# Patient Record
Sex: FEMALE | Race: AMERICAN INDIAN OR ALASKA NATIVE
[De-identification: names, ages, dates, MRNs, and addresses within clinical notes are randomized per-mention and may not be internally consistent; named-entity substitution may affect disease eponyms.]

---

## 2018-03-22 ENCOUNTER — HOSPITAL ENCOUNTER (OUTPATIENT)
Dept: HOSPITAL 14 - H.ER | Age: 48
Setting detail: OBSERVATION
LOS: 2 days | Discharge: HOME | End: 2018-03-24
Attending: SPECIALIST | Admitting: SPECIALIST
Payer: COMMERCIAL

## 2018-03-22 VITALS — BODY MASS INDEX: 26.7 KG/M2

## 2018-03-22 DIAGNOSIS — D25.9: ICD-10-CM

## 2018-03-22 DIAGNOSIS — D50.9: Primary | ICD-10-CM

## 2018-03-22 LAB
% IRON SATURATION: 2.3 % (ref 20–55)
ALBUMIN SERPL-MCNC: 3.9 G/DL (ref 3.5–5)
ALBUMIN/GLOB SERPL: 1.1 {RATIO} (ref 1–2.1)
ALT SERPL-CCNC: 28 U/L (ref 9–52)
AST SERPL-CCNC: 23 U/L (ref 14–36)
BASOPHILS # BLD AUTO: 0.2 K/UL (ref 0–0.2)
BASOPHILS NFR BLD: 2.5 % (ref 0–2)
BILIRUB UR-MCNC: NEGATIVE MG/DL
BUN SERPL-MCNC: 11 MG/DL (ref 7–17)
CALCIUM SERPL-MCNC: 9.2 MG/DL (ref 8.4–10.2)
COLOR UR: (no result)
EOSINOPHIL # BLD AUTO: 0.2 K/UL (ref 0–0.7)
EOSINOPHIL NFR BLD: 2.6 % (ref 0–4)
ERYTHROCYTE [DISTWIDTH] IN BLOOD BY AUTOMATED COUNT: 31.8 % (ref 11.5–14.5)
FOLATE SERPL-MCNC: > 20 NG/ML
GFR NON-AFRICAN AMERICAN: > 60
GLUCOSE UR STRIP-MCNC: (no result) MG/DL
HGB BLD-MCNC: 5.3 G/DL (ref 12–16)
INR PPP: 1.4 (ref 0.9–1.2)
IRON SERPL-MCNC: < 10 UG/DL (ref 37–170)
LEUKOCYTE ESTERASE UR-ACNC: (no result) LEU/UL
LYMPHOCYTES # BLD AUTO: 1.4 K/UL (ref 1–4.3)
LYMPHOCYTES NFR BLD AUTO: 19 % (ref 20–40)
MCH RBC QN AUTO: 18 PG (ref 27–31)
MCHC RBC AUTO-ENTMCNC: 28.9 G/DL (ref 33–37)
MCV RBC AUTO: 62.3 FL (ref 81–99)
MONOCYTES # BLD: 0.6 K/UL (ref 0–0.8)
MONOCYTES NFR BLD: 7.6 % (ref 0–10)
NEUTROPHILS # BLD: 5 K/UL (ref 1.8–7)
NEUTROPHILS NFR BLD AUTO: 68.3 % (ref 50–75)
NRBC BLD AUTO-RTO: 0.4 % (ref 0–0)
PH UR STRIP: 7 [PH] (ref 5–8)
PLATELET # BLD: 759 K/UL (ref 130–400)
PMV BLD AUTO: 8.7 FL (ref 7.2–11.7)
PROT UR STRIP-MCNC: NEGATIVE MG/DL
PROTHROMBIN TIME: 16 SECONDS (ref 9.8–13.1)
RBC # BLD AUTO: 2.94 MIL/UL (ref 3.8–5.2)
RBC # UR STRIP: NEGATIVE /UL
SP GR UR STRIP: 1 (ref 1–1.03)
SQUAMOUS EPITHIAL: < 1 /HPF (ref 0–5)
TIBC SERPL-MCNC: 428 UG/DL (ref 250–450)
URINE CLARITY: CLEAR
UROBILINOGEN UR-MCNC: (no result) MG/DL (ref 0.2–1)
VIT B12 SERPL-MCNC: > 1000 PG/ML (ref 239–931)
WBC # BLD AUTO: 7.3 K/UL (ref 4.8–10.8)

## 2018-03-22 PROCEDURE — 80053 COMPREHEN METABOLIC PANEL: CPT

## 2018-03-22 PROCEDURE — 36415 COLL VENOUS BLD VENIPUNCTURE: CPT

## 2018-03-22 PROCEDURE — 85014 HEMATOCRIT: CPT

## 2018-03-22 PROCEDURE — 86900 BLOOD TYPING SEROLOGIC ABO: CPT

## 2018-03-22 PROCEDURE — 83540 ASSAY OF IRON: CPT

## 2018-03-22 PROCEDURE — 99285 EMERGENCY DEPT VISIT HI MDM: CPT

## 2018-03-22 PROCEDURE — 83550 IRON BINDING TEST: CPT

## 2018-03-22 PROCEDURE — 82607 VITAMIN B-12: CPT

## 2018-03-22 PROCEDURE — 85025 COMPLETE CBC W/AUTO DIFF WBC: CPT

## 2018-03-22 PROCEDURE — 76856 US EXAM PELVIC COMPLETE: CPT

## 2018-03-22 PROCEDURE — 85610 PROTHROMBIN TIME: CPT

## 2018-03-22 PROCEDURE — 85018 HEMOGLOBIN: CPT

## 2018-03-22 PROCEDURE — 85660 RBC SICKLE CELL TEST: CPT

## 2018-03-22 PROCEDURE — 83021 HEMOGLOBIN CHROMOTOGRAPHY: CPT

## 2018-03-22 PROCEDURE — 86850 RBC ANTIBODY SCREEN: CPT

## 2018-03-22 PROCEDURE — 74177 CT ABD & PELVIS W/CONTRAST: CPT

## 2018-03-22 PROCEDURE — 85041 AUTOMATED RBC COUNT: CPT

## 2018-03-22 PROCEDURE — 86920 COMPATIBILITY TEST SPIN: CPT

## 2018-03-22 PROCEDURE — 81003 URINALYSIS AUTO W/O SCOPE: CPT

## 2018-03-22 PROCEDURE — 82746 ASSAY OF FOLIC ACID SERUM: CPT

## 2018-03-22 PROCEDURE — 71046 X-RAY EXAM CHEST 2 VIEWS: CPT

## 2018-03-22 PROCEDURE — 36430 TRANSFUSION BLD/BLD COMPNT: CPT

## 2018-03-22 PROCEDURE — 82728 ASSAY OF FERRITIN: CPT

## 2018-03-22 PROCEDURE — 74183 MRI ABD W/O CNTR FLWD CNTR: CPT

## 2018-03-22 PROCEDURE — 85044 MANUAL RETICULOCYTE COUNT: CPT

## 2018-03-22 PROCEDURE — 85027 COMPLETE CBC AUTOMATED: CPT

## 2018-03-22 PROCEDURE — 93005 ELECTROCARDIOGRAM TRACING: CPT

## 2018-03-22 PROCEDURE — 81025 URINE PREGNANCY TEST: CPT

## 2018-03-22 NOTE — RAD
HISTORY:

Anemia  



COMPARISON:

No prior.



TECHNIQUE:

Chest PA and lateral



FINDINGS:



LUNGS:

No active pulmonary disease.



PLEURA:

No significant pleural effusion identified. No pneumothorax apparent.



CARDIOVASCULAR:

Normal.



OSSEOUS STRUCTURES:

No significant abnormalities.



VISUALIZED UPPER ABDOMEN:

Normal.



OTHER FINDINGS:

None.



IMPRESSION:

No active disease.

## 2018-03-22 NOTE — ED PDOC
HPI: General Adult


Time Seen by Provider: 03/22/18 09:36


Chief Complaint (Nursing): Abnormal Labs


Chief Complaint (Provider): Abnormal labs


History Per: Patient


History/Exam Limitations: no limitations


Onset/Duration Of Symptoms: Hrs (today)


Current Symptoms Are (Timing): Still Present


Recently: Treated By A Physician


Additional Complaint(s): 





Geovany Moreno is a 47 year old female, with no significant past medical 

history, who was sent to the emergency department by PMD for outpatient 

hemoglobin of 5.4. Patient states she has been mildly fatigued recently. She 

denies any chest pain, shortness of breath, bloody or melanotic stools, and 

heavy period. No further medical complaints.





PMD: Kevin Contreras





Past Medical History


Reviewed: Historical Data, Nursing Documentation, Vital Signs


Vital Signs: 


 Last Vital Signs











Temp  98.2 F   03/22/18 09:38


 


Pulse  76   03/22/18 09:38


 


Resp  20   03/22/18 09:38


 


BP  152/80 H  03/22/18 09:38


 


Pulse Ox  99   03/22/18 10:33














- Medical History


PMH: No Chronic Diseases





- Surgical History


Surgical History: No Surg Hx





- Family History


Family History: States: Other


Other Family History: anemia of unknown type





- Social History


Current smoker - smoking cessation education provided: No


Alcohol: None


Drugs: Denies





- Home Medications


Home Medications: 


 Ambulatory Orders











 Medication  Instructions  Recorded


 


No Known Home Med  03/22/18














- Allergies


Allergies/Adverse Reactions: 


 Allergies











Allergy/AdvReac Type Severity Reaction Status Date / Time


 


No Known Allergies Allergy   Verified 03/22/18 09:52














Review of Systems


ROS Statement: Except As Marked, All Systems Reviewed And Found Negative


Cardiovascular: Negative for: Chest Pain


Respiratory: Negative for: Shortness of Breath


Gastrointestinal: Negative for: Melena, Hematochezia





Physical Exam





- Reviewed


Nursing Documentation Reviewed: Yes


Vital Signs Reviewed: Yes





- Physical Exam


Appears: Positive for: Well, Non-toxic, No Acute Distress


Head Exam: Positive for: ATRAUMATIC, NORMAL INSPECTION, NORMOCEPHALIC


Skin: Positive for: Normal Color, Warm, Dry


Eye Exam: Positive for: EOMI, PERRL.  Negative for: Normal appearance (

conjunctival pallor)


Neck: Positive for: Normal (No thyromegaly), Painless ROM, Supple


Cardiovascular/Chest: Positive for: Regular Rate, Rhythm.  Negative for: Murmur


Respiratory: Positive for: Normal Breath Sounds.  Negative for: Respiratory 

Distress


Gastrointestinal/Abdominal: Positive for: Normal Exam, Soft.  Negative for: 

Tenderness


Extremity: Positive for: Normal ROM (all extremities), Other (pale nail beds).  

Negative for: Deformity, Swelling


Neurologic/Psych: Positive for: Alert, Oriented (x3).  Negative for: Motor/

Sensory Deficits





- Laboratory Results


Result Diagrams: 


 03/22/18 10:10





 03/22/18 10:10





- ECG


O2 Sat by Pulse Oximetry: 99 (RA)


Pulse Ox Interpretation: Normal





Medical Decision Making


Medical Decision Making: 





Initial Plan:





--Type and screen


--EKG


--CMP


--Folate


--Iron & TIBC


--Vitamin b12


--Urine pregnancy


--CBC w/ differential


--Reticulocyte count


--Sickle cell screen


--Chest two views (PA/LAT) [RAD]


--Sodium Chloride 1,000 ml  mls/hr


--Urinalysis


--Reevaluation





-ABO/RH type








--------------------------------------------------------------------------------

-----------------   


Scribe Attestation:


Documented by Guru Leahy, acting as a scribe for Kevin Hammond MD





Provider Scribe Attestation:


All medical record entries made by the Scribe were at my direction and 

personally dictated by me. I have reviewed the chart and agree that the record 

accurately reflects my personal performance of the history, physical exam, 

medical decision making, and the department course for this patient. I have 

also personally directed, reviewed, and agree with the discharge instructions 

and disposition.





Disposition





- Clinical Impression


Clinical Impression: 


 Anemia








- Patient ED Disposition


Is Patient to be Admitted: Yes





- Disposition


Disposition Time: 11:19


Condition: FAIR


Forms:  [facility] (English)





- Pt Status Changed To:


Hospital Disposition Of: Inpatient





- Admit Certification


Admit to Inpatient:: After my assessment, the patient will require 

hospitalization for at least two midnights.  This is because of the severity of 

symptoms shown, intensity of services needed, and/or the medical risk in this 

patient being treated as an outpatient.





- POA


Present On Arrival: None

## 2018-03-22 NOTE — CT
PROCEDURE:  CT Abdomen and Pelvis with contrast



HISTORY:

Anemia



COMPARISON:

None.



TECHNIQUE:

Contrast dose: Omnipaque 300, 95 cc.



Radiation dose:



Total exam DLP = 593.92 mGy-cm.



This CT exam was performed using one or more of the following dose 

reduction techniques: Automated exposure control, adjustment of the 

mA and/or kV according to patient size, and/or use of iterative 

reconstruction technique.



FINDINGS:



LOWER THORAX:

Unremarkable. 



LIVER:

There are a few tiny scattered lucencies in the left and right lobes 

liver which are under 1 cm size a too small to characterize. 1 cm 

cyst is seen at the left lobe anteriorly (8 Hounsfield units) with an 

indeterminate lucency measuring 1.7 cm at the inferomedial right lobe 

liver measuring 18 Hounsfield units. Further, mild diffusely 

diminished attenuation seen throughout the liver suggests of hepatic 

steatosis. No intrahepatic biliary dilatation identified. 



GALLBLADDER AND BILE DUCTS:

Contracted and otherwise unremarkable appearing. 



PANCREAS:

Unremarkable. No gross lesion or ductal dilatation.



SPLEEN:

Unremarkable. 



ADRENALS:

Unremarkable. No mass. 



KIDNEYS AND URETERS:

Bilateral renal lucencies are scattered in a relatively numerous. The 

majority are under 1 cm size are too small to characterize.  However, 

there are several which exhibit intermediate density measurements and 

therefore cannot be characterized as simple cyst. At the upper pole 

left kidney, there is a 1.8 cm lucency measuring 25 Hounsfield units. 

At the upper pole right kidney is a 1.9 cm lucency measuring 21 

Hounsfield units. At the midpole right kidney anteriorly is a 2.3 cm 

lucency measuring 19 Hounsfield units and posterior medial to it is a 

1.9 cm lucency measuring 19 Hounsfield units as well. Finally a 2.2 

cm lucency is seen at the lower pole right kidney measuring 18 

Hounsfield units. This lucencies likely reflect benign renal cortical 

cysts though potentially complicated. A precautionary elective 

follow-up MRI or CT of the kidneys is recommended without and with 

contrast, or elective ultrasound of the kidneys.



No obstructive uropathy or perinephric reaction bilaterally. No 

radiodense urolithiasis. 



VASCULATURE:

Unremarkable. No aortic aneurysm. 



BOWEL:

Moderate fecal loading is seen throughout the right hemicolon and is 

limited throughout the remainder.  A umbilical hernia is identified 

containing a limited amount of small bowel without obvious 

incarceration pattern evident. No bowel obstruction is appreciated 

overall. The stomach is collapsed and poorly evaluated.



APPENDIX:

Normal appendix. 



PERITONEUM:

Unremarkable. No free fluid. No free air. 



LYMPH NODES:

Unremarkable. No enlarged lymph nodes. 



BLADDER:

Decompressed with mural thickening evident.  Consider potential 

cystitis though this is not definite. 



REPRODUCTIVE:

Enlarged uterus identified with numerous small masses related to the 

myometrium and 1 apparently exophytic off the lower uterine segment 

laterally suggestive of extensive fibroid disease. 



BONES:

No acute fracture. 



OTHER FINDINGS:

None.



IMPRESSION:

1. No definite acute abdominal finding although a small umbilical 

hernia contains limited small-bowel loops. No definite incarceration 

pattern appreciable this time. 



2. A small right lobe hepatic cysts probably reflects a benign cyst 

but has slightly elevated density.  Consider follow-up elective MRI 

or CT with and without contrast for additional evaluation or 

ultrasonography. Similar changes are seen but on a more numerous 

pattern in the right kidney as well as a solitary lucency at the 

upper pole left kidney with the same follow-up elective imaging 

recommendation. 



3. Enlarged fibroid uterus noted. 



4. Thickened urinary bladder walls may be a function of decompression 

the cystitis is not excluded.  Further clinical correlation is 

recommended.

## 2018-03-23 LAB
ERYTHROCYTE [DISTWIDTH] IN BLOOD BY AUTOMATED COUNT: 34.1 % (ref 11.5–14.5)
HGB BLD-MCNC: 8.1 G/DL (ref 12–16)
MCH RBC QN AUTO: 18.1 PG (ref 27–33)
MCH RBC QN AUTO: 21 PG (ref 27–31)
MCHC RBC AUTO-ENTMCNC: 30.7 G/DL (ref 33–37)
MCV RBC AUTO: 68.4 FL (ref 81–99)
MCV RBC: 64 FL (ref 80–100)
PLATELET # BLD: 789 K/UL (ref 130–400)
RBC # BLD AUTO: 3.87 MIL/UL (ref 3.8–5.2)
WBC # BLD AUTO: 9.4 K/UL (ref 4.8–10.8)

## 2018-03-23 NOTE — CP.PCM.CON
History of Present Illness





- History of Present Illness


History of Present Illness: 





This is a 47 yrs old female who was admitted for anemia. She was brought to her 

primary doctor's office and was found to have a low hgb and was sent to the ER 

with c/o some weakness. Her Hgb was found to be 5.3gms wbc 7.3 and platelets 

759K.  She does not give a history of bleeding from  any site, her [id number] 

according to her were normal, but here in the hospital she is having  a period 

and the nurse told the pt that it was heavy. Pt's mother  recently of anemia

, and her sister has anemia too. She has no abdominal pain, and no hgb 

electrophoresis has ever been done. 


retic count was 3.4,  iron<30, sat 2.3%, and ferritin is only 3.0. Her B12 is 

normal but the sickle prep is normal. A hgb electrophoresis hqas been ordered.


the ct scan of the abdomen showed a couple of ?cysts in the liver and in the 

kidneys. She also has a fibroid of the uterus. The radiologist has suggested a 

MRI


of the abdomen to clarify the lesions in the liver and kidneys.





No significant medical problems





Pt is a non smoker and does not drink,





Family history; Mother  of anemia and younger sister is also anemic





Past Patient History





- Past Medical History & Family History


Past Medical History?: No





- Past Social History


Smoking Status: Never Smoked





- CARDIAC


Hx Cardiac Disorders: No





- PULMONARY


Hx Respiratory Disorders: No





- NEUROLOGICAL


Hx Neurological Disorder: No





- HEENT


Hx HEENT Problems: No





- RENAL


Hx Chronic Kidney Disease: No





- ENDOCRINE/METABOLIC


Hx Endocrine Disorders: No





- HEMATOLOGICAL/ONCOLOGICAL


Hx Blood Disorders: Yes (anemia)





- INTEGUMENTARY


Hx Dermatological Problems: No





- MUSCULOSKELETAL/RHEUMATOLOGICAL


Hx Musculoskeletal Disorders: No


Hx Falls: No





- GENITOURINARY/GYNECOLOGICAL


Hx Genitourinary Disorders: No





- PSYCHIATRIC


Hx Psychophysiologic Disorder: No


Hx Substance Use: No





- SURGICAL HISTORY


Hx Surgeries: No





- ANESTHESIA


Hx Anesthesia: No





Meds


Allergies/Adverse Reactions: 


 Allergies











Allergy/AdvReac Type Severity Reaction Status Date / Time


 


No Known Allergies Allergy   Verified 18 09:52














- Medications


Medications: 


 Current Medications





Docusate Sodium (Colace)  200 mg PO DAILY PRN


   PRN Reason: Constipation


Ferrous Sulfate (Feosol)  325 mg PO BID LARA











Physical Exam





- Additional Findings


Additional findings: 


Physical exam; Alert, well oriented in no acute distress


neck supple, no adenopathy


Chest; Clear, no rales or rhonchi


Heart; RSR, no murmur


Abd; soft, no mass, no h/s megaly








Results





- Vital Signs


Recent Vital Signs: 


 Last Vital Signs











Temp  98.1 F   18 08:03


 


Pulse  55 L  18 08:03


 


Resp  20   18 08:03


 


BP  129/65   18 08:03


 


Pulse Ox  100   18 08:03














- Labs


Result Diagrams: 


 18 10:10





 18 10:10


Labs: 


 Laboratory Results - last 24 hr











  18





  10:00 10:10 10:10


 


WBC   7.3 


 


RBC   2.94 L 


 


Hgb   5.3 L* 


 


Hct   18.3 L 


 


MCV   62.3 L 


 


MCH   18.0 L 


 


MCHC   28.9 L 


 


RDW   31.8 H 


 


Plt Count   759 H 


 


MPV   8.7 


 


Neut % (Auto)   68.3 


 


Lymph % (Auto)   19.0 L 


 


Mono % (Auto)   7.6 


 


Eos % (Auto)   2.6 


 


Baso % (Auto)   2.5 H 


 


Neut # (Auto)   5.0 


 


Lymph # (Auto)   1.4 


 


Mono # (Auto)   0.6 


 


Eos # (Auto)   0.2 


 


Baso # (Auto)   0.2 


 


Retic Count   


 


Sickle Cell Screen   


 


Hemoglobinopathy Red Blood Count   


 


Hemoglobinopathy Hct   


 


Hemoglobinopathy Hgb   


 


Hemoglobinopathy MCV   


 


Hemoglobinopathy MCH   


 


Hemoglobinopathy RDW   


 


PT   


 


INR   


 


Sodium    144


 


Potassium    4.0


 


Chloride    105


 


Carbon Dioxide    26


 


Anion Gap    17


 


BUN    11


 


Creatinine    0.8


 


Est GFR ( Amer)    > 60


 


Est GFR (Non-Af Amer)    > 60


 


Random Glucose    98


 


Calcium    9.2


 


Iron  < 10 L  


 


TIBC  428  


 


% Saturation  2.3 L  


 


Ferritin   


 


Total Bilirubin    0.5


 


AST    23


 


ALT    28


 


Alkaline Phosphatase    45


 


Total Protein    7.6


 


Albumin    3.9


 


Globulin    3.6


 


Albumin/Globulin Ratio    1.1


 


Vitamin B12    > 1000 H


 


Folate    > 20.0


 


Urine Color   


 


Urine Clarity   


 


Urine pH   


 


Ur Specific Gravity   


 


Urine Protein   


 


Urine Glucose (UA)   


 


Urine Ketones   


 


Urine Blood   


 


Urine Nitrate   


 


Urine Bilirubin   


 


Urine Urobilinogen   


 


Ur Leukocyte Esterase   


 


Urine RBC (Auto)   


 


Urine Microscopic WBC   


 


Ur Squamous Epith Cells   


 


Blood Type   


 


Blood Type Confirm   


 


Antibody Screen   


 


Crossmatch   


 


BBK History Checked   














  18





  10:10 10:10 10:10


 


WBC   


 


RBC   


 


Hgb   


 


Hct   


 


MCV   


 


MCH   


 


MCHC   


 


RDW   


 


Plt Count   


 


MPV   


 


Neut % (Auto)   


 


Lymph % (Auto)   


 


Mono % (Auto)   


 


Eos % (Auto)   


 


Baso % (Auto)   


 


Neut # (Auto)   


 


Lymph # (Auto)   


 


Mono # (Auto)   


 


Eos # (Auto)   


 


Baso # (Auto)   


 


Retic Count    3.4 H


 


Sickle Cell Screen   


 


Hemoglobinopathy Red Blood Count   


 


Hemoglobinopathy Hct   


 


Hemoglobinopathy Hgb   


 


Hemoglobinopathy MCV   


 


Hemoglobinopathy MCH   


 


Hemoglobinopathy RDW   


 


PT   


 


INR   


 


Sodium   


 


Potassium   


 


Chloride   


 


Carbon Dioxide   


 


Anion Gap   


 


BUN   


 


Creatinine   


 


Est GFR ( Amer)   


 


Est GFR (Non-Af Amer)   


 


Random Glucose   


 


Calcium   


 


Iron   Cancelled 


 


TIBC   Cancelled 


 


% Saturation   Cancelled 


 


Ferritin   


 


Total Bilirubin   


 


AST   


 


ALT   


 


Alkaline Phosphatase   


 


Total Protein   


 


Albumin   


 


Globulin   


 


Albumin/Globulin Ratio   


 


Vitamin B12   


 


Folate   


 


Urine Color   


 


Urine Clarity   


 


Urine pH   


 


Ur Specific Gravity   


 


Urine Protein   


 


Urine Glucose (UA)   


 


Urine Ketones   


 


Urine Blood   


 


Urine Nitrate   


 


Urine Bilirubin   


 


Urine Urobilinogen   


 


Ur Leukocyte Esterase   


 


Urine RBC (Auto)   


 


Urine Microscopic WBC   


 


Ur Squamous Epith Cells   


 


Blood Type  O NEGATIVE  


 


Blood Type Confirm   


 


Antibody Screen  Negative  


 


Crossmatch  See Detail  


 


BBK History Checked  No verified bt  














  18





  10:10 10:25 10:39


 


WBC   


 


RBC   


 


Hgb   


 


Hct   


 


MCV   


 


MCH   


 


MCHC   


 


RDW   


 


Plt Count   


 


MPV   


 


Neut % (Auto)   


 


Lymph % (Auto)   


 


Mono % (Auto)   


 


Eos % (Auto)   


 


Baso % (Auto)   


 


Neut # (Auto)   


 


Lymph # (Auto)   


 


Mono # (Auto)   


 


Eos # (Auto)   


 


Baso # (Auto)   


 


Retic Count   


 


Sickle Cell Screen  Positive H  


 


Hemoglobinopathy Red Blood Count   


 


Hemoglobinopathy Hct   


 


Hemoglobinopathy Hgb   


 


Hemoglobinopathy MCV   


 


Hemoglobinopathy MCH   


 


Hemoglobinopathy RDW   


 


PT   16.0 H 


 


INR   1.4 H 


 


Sodium   


 


Potassium   


 


Chloride   


 


Carbon Dioxide   


 


Anion Gap   


 


BUN   


 


Creatinine   


 


Est GFR ( Amer)   


 


Est GFR (Non-Af Amer)   


 


Random Glucose   


 


Calcium   


 


Iron   


 


TIBC   


 


% Saturation   


 


Ferritin   


 


Total Bilirubin   


 


AST   


 


ALT   


 


Alkaline Phosphatase   


 


Total Protein   


 


Albumin   


 


Globulin   


 


Albumin/Globulin Ratio   


 


Vitamin B12   


 


Folate   


 


Urine Color   


 


Urine Clarity   


 


Urine pH   


 


Ur Specific Gravity   


 


Urine Protein   


 


Urine Glucose (UA)   


 


Urine Ketones   


 


Urine Blood   


 


Urine Nitrate   


 


Urine Bilirubin   


 


Urine Urobilinogen   


 


Ur Leukocyte Esterase   


 


Urine RBC (Auto)   


 


Urine Microscopic WBC   


 


Ur Squamous Epith Cells   


 


Blood Type   


 


Blood Type Confirm    O NEGATIVE


 


Antibody Screen   


 


Crossmatch   


 


BBK History Checked   














  1818





  10:50 12:06 12:06


 


WBC   


 


RBC   


 


Hgb   


 


Hct   


 


MCV   


 


MCH   


 


MCHC   


 


RDW   


 


Plt Count   


 


MPV   


 


Neut % (Auto)   


 


Lymph % (Auto)   


 


Mono % (Auto)   


 


Eos % (Auto)   


 


Baso % (Auto)   


 


Neut # (Auto)   


 


Lymph # (Auto)   


 


Mono # (Auto)   


 


Eos # (Auto)   


 


Baso # (Auto)   


 


Retic Count   


 


Sickle Cell Screen   


 


Hemoglobinopathy Red Blood Count    2.93 L


 


Hemoglobinopathy Hct    18.8 L


 


Hemoglobinopathy Hgb    5.3 L


 


Hemoglobinopathy MCV    64.0 L


 


Hemoglobinopathy MCH    18.1 L


 


Hemoglobinopathy RDW    32.6 H


 


PT   


 


INR   


 


Sodium   


 


Potassium   


 


Chloride   


 


Carbon Dioxide   


 


Anion Gap   


 


BUN   


 


Creatinine   


 


Est GFR ( Amer)   


 


Est GFR (Non-Af Amer)   


 


Random Glucose   


 


Calcium   


 


Iron   


 


TIBC   


 


% Saturation   


 


Ferritin   3.0 L 


 


Total Bilirubin   


 


AST   


 


ALT   


 


Alkaline Phosphatase   


 


Total Protein   


 


Albumin   


 


Globulin   


 


Albumin/Globulin Ratio   


 


Vitamin B12   


 


Folate   


 


Urine Color  Straw  


 


Urine Clarity  Clear  


 


Urine pH  7.0  


 


Ur Specific Gravity  1.005  


 


Urine Protein  Negative  


 


Urine Glucose (UA)  Neg  


 


Urine Ketones  Negative  


 


Urine Blood  Negative  


 


Urine Nitrate  Negative  


 


Urine Bilirubin  Negative  


 


Urine Urobilinogen  0.2-1.0  


 


Ur Leukocyte Esterase  Neg  


 


Urine RBC (Auto)  1  


 


Urine Microscopic WBC  1  


 


Ur Squamous Epith Cells  < 1  


 


Blood Type   


 


Blood Type Confirm   


 


Antibody Screen   


 


Crossmatch   


 


BBK History Checked   














Assessment & Plan





- Assessment and Plan (Free Text)


Assessment: 





Impression; Severe microcytic anemia secondary to iron deficiency and maybe a 

hemoglobinopathy


               Have to r/o masses in the kidney and liver as suggested by the 

radiologist


Plan: 





Plan; Have started her on iron feosol bid. 


awaiting her result from the transfusion to see if she needs more transfusions. 


MRI of the liver and kidneys.





- Date & Time


Date: 18


Time: 09:06

## 2018-03-23 NOTE — US
HISTORY:

R/O cyst in liver and kidney



COMPARISON:

None available.



TECHNIQUE:

Transabdominal



FINDINGS:



UTERUS:

Measures 10.7 x 5.0 x 6.9 cm. Mid to lower left uterine fibroid, 3.1 

x 2.9 x 3.7 cm. Mid uterine fibroid, 3.3 x 2.7 x 3.4 cm.



ENDOMETRIUM:

Measures 7 mm in diameter. Unremarkable. 



CERVIX:

No cervical abnormality identified.



RIGHT OVARY:

Measures 1.8 x 1.6 x 3.4 cm. No solid mass. Normal flow. 



LEFT OVARY:

Measures 2.3 x 1.6 x 3.1 cm. No solid mass. Normal flow. 



FREE FLUID:

No significant free fluid noted.



OTHER FINDINGS:

None. 



IMPRESSION:

Two uterine fibroids. Otherwise unremarkable.

## 2018-03-23 NOTE — CARD
--------------- APPROVED REPORT --------------





EKG Measurement

Heart [id number]

MI [id number]

[id number]

[id number]

[id number]



<Conclusion>

Normal sinus rhythm

Normal ECG

## 2018-03-24 VITALS
RESPIRATION RATE: 16 BRPM | OXYGEN SATURATION: 100 % | TEMPERATURE: 98 F | DIASTOLIC BLOOD PRESSURE: 85 MMHG | SYSTOLIC BLOOD PRESSURE: 142 MMHG

## 2018-03-24 VITALS — HEART RATE: 61 BPM

## 2018-03-26 LAB
HEMOGLOBIN A: 97.3 PERCENT (ref 96–?)
HGB A2 MFR BLD COLUMN CHROM: 1.7 PERCENT (ref 1.8–3.5)
HGB C MFR BLD: 0 PERCENT (ref 0–0)
HGB S MFR BLD: 0 PERCENT (ref 0–0)

## 2018-03-26 NOTE — MRI
PROCEDURE:  MRI Abdomen with and without contrast



HISTORY:

Multiple hepatic and bilateral renal lesions.



COMPARISON:

Abdomen pelvis CT with contrast 03/22/2018. 



TECHNIQUE:

Multisequence, multiplanar MR images of the abdomen with and without 

gadolinium contrast enhancement.



FINDINGS:



LIVER:

There are multiple small foci in the left and right lobes liver which 

exhibit fluid signal intensity throughout all sequences and do not 

enhance.  Several of these are too small to characterize however a 9 

mm lesion in the right lobe liver near the dome and a 1.1 cm lesion 

at the left lobe anteriorly do not enhance and are representative of 

cysts, apparently simple. The liver otherwise appears unremarkable.



GALLBLADDER:

Unremarkable.



SPLEEN:

Unremarkable.



PANCREAS:

 Unremarkable.



ADRENALS:

Unremarkable.



KIDNEYS:

Similar to the liver, there are multiple cysts which are more 

numerous in the right than the left kidney with both kidneys 

exhibiting more cysts than seen in the liver. None of these foci 

exhibit significant enhancement however there are numerous tiny foci 

in both kidneys too small to characterize.  The dominant right renal 

cyst is seen at the midpole anteriorly measuring 2.5 cm in the 

dominant left renal cyst is identified at the upper pole medially 

measuring 2.0 cm. All appear simple in character.



AORTA:

No aneurysm.



ASCITES:

 None.



PERITONEUM:

Unremarkable.



LYMPH NODES:

 Unremarkable.



OTHER FINDINGS:

Incidental fibroid noted in the submucous uterus.



IMPRESSION:

Benign cystic changes seen the liver and bilateral kidneys as 

discussed above without, creation or mass related. None appear to 

enhance.  Note, multiple sub cm foci are scattered in both kidneys 

and infrequently in the liver as well, too small to characterize.



Incidental fibroid noted in submucous uterus. Please see separate 

trans abdominal pelvic ultrasound also performed 03/23/2018.



[facility] preliminary report from [facility], 03/23/2018.